# Patient Record
Sex: FEMALE | URBAN - METROPOLITAN AREA
[De-identification: names, ages, dates, MRNs, and addresses within clinical notes are randomized per-mention and may not be internally consistent; named-entity substitution may affect disease eponyms.]

---

## 2021-01-11 ENCOUNTER — APPOINTMENT (RX ONLY)
Dept: URBAN - METROPOLITAN AREA CLINIC 4 | Facility: CLINIC | Age: 46
Setting detail: DERMATOLOGY
End: 2021-01-11

## 2021-01-11 DIAGNOSIS — L57.0 ACTINIC KERATOSIS: ICD-10-CM

## 2021-01-11 DIAGNOSIS — Z71.89 OTHER SPECIFIED COUNSELING: ICD-10-CM

## 2021-01-11 PROCEDURE — ? SUNSCREEN TREATMENT REGIMEN

## 2021-01-11 PROCEDURE — ? COUNSELING

## 2021-01-11 PROCEDURE — 17000 DESTRUCT PREMALG LESION: CPT

## 2021-01-11 PROCEDURE — ? LIQUID NITROGEN

## 2021-01-11 ASSESSMENT — LOCATION ZONE DERM
LOCATION ZONE: FACE
LOCATION ZONE: NOSE

## 2021-01-11 ASSESSMENT — LOCATION SIMPLE DESCRIPTION DERM
LOCATION SIMPLE: INFERIOR FOREHEAD
LOCATION SIMPLE: NOSE

## 2021-01-11 ASSESSMENT — LOCATION DETAILED DESCRIPTION DERM
LOCATION DETAILED: INFERIOR MID FOREHEAD
LOCATION DETAILED: NASAL DORSUM

## 2021-01-11 NOTE — PROCEDURE: LIQUID NITROGEN
Number Of Freeze-Thaw Cycles: 2 freeze-thaw cycles
Post-Care Instructions: I reviewed with the patient in detail post-care instructions. Patient is to wear sunprotection, and avoid picking at any of the treated lesions. Pt may apply Vaseline to crusted or scabbing areas.
Detail Level: Detailed
Render Note In Bullet Format When Appropriate: No
Duration Of Freeze Thaw-Cycle (Seconds): 5
Consent: The patient's consent was obtained including but not limited to risks of crusting, scabbing, blistering, scarring, darker or lighter pigmentary change, recurrence, incomplete removal and infection.

## 2021-06-08 ENCOUNTER — HOSPITAL ENCOUNTER (OUTPATIENT)
Dept: LAB | Facility: MEDICAL CENTER | Age: 46
End: 2021-06-08
Attending: PSYCHIATRY & NEUROLOGY
Payer: COMMERCIAL

## 2021-06-08 LAB
ALBUMIN SERPL BCP-MCNC: 4.2 G/DL (ref 3.2–4.9)
ALBUMIN/GLOB SERPL: 1.3 G/DL
ALP SERPL-CCNC: 85 U/L (ref 30–99)
ALT SERPL-CCNC: 52 U/L (ref 2–50)
ANION GAP SERPL CALC-SCNC: 12 MMOL/L (ref 7–16)
AST SERPL-CCNC: 44 U/L (ref 12–45)
BASOPHILS # BLD AUTO: 1.2 % (ref 0–1.8)
BASOPHILS # BLD: 0.08 K/UL (ref 0–0.12)
BILIRUB SERPL-MCNC: 0.3 MG/DL (ref 0.1–1.5)
BUN SERPL-MCNC: 11 MG/DL (ref 8–22)
CALCIUM SERPL-MCNC: 8.8 MG/DL (ref 8.5–10.5)
CHLORIDE SERPL-SCNC: 102 MMOL/L (ref 96–112)
CHOLEST SERPL-MCNC: 216 MG/DL (ref 100–199)
CO2 SERPL-SCNC: 25 MMOL/L (ref 20–33)
CREAT SERPL-MCNC: 0.68 MG/DL (ref 0.5–1.4)
EOSINOPHIL # BLD AUTO: 0.41 K/UL (ref 0–0.51)
EOSINOPHIL NFR BLD: 6.3 % (ref 0–6.9)
ERYTHROCYTE [DISTWIDTH] IN BLOOD BY AUTOMATED COUNT: 51.8 FL (ref 35.9–50)
FASTING STATUS PATIENT QL REPORTED: NORMAL
FOLATE SERPL-MCNC: 6.6 NG/ML
GLOBULIN SER CALC-MCNC: 3.3 G/DL (ref 1.9–3.5)
GLUCOSE SERPL-MCNC: 112 MG/DL (ref 65–99)
HCT VFR BLD AUTO: 49.1 % (ref 37–47)
HDLC SERPL-MCNC: 54 MG/DL
HGB BLD-MCNC: 15.9 G/DL (ref 12–16)
IMM GRANULOCYTES # BLD AUTO: 0.02 K/UL (ref 0–0.11)
IMM GRANULOCYTES NFR BLD AUTO: 0.3 % (ref 0–0.9)
LDLC SERPL CALC-MCNC: 116 MG/DL
LYMPHOCYTES # BLD AUTO: 2.46 K/UL (ref 1–4.8)
LYMPHOCYTES NFR BLD: 37.9 % (ref 22–41)
MCH RBC QN AUTO: 34 PG (ref 27–33)
MCHC RBC AUTO-ENTMCNC: 32.4 G/DL (ref 33.6–35)
MCV RBC AUTO: 105.1 FL (ref 81.4–97.8)
MONOCYTES # BLD AUTO: 0.38 K/UL (ref 0–0.85)
MONOCYTES NFR BLD AUTO: 5.9 % (ref 0–13.4)
NEUTROPHILS # BLD AUTO: 3.14 K/UL (ref 2–7.15)
NEUTROPHILS NFR BLD: 48.4 % (ref 44–72)
NRBC # BLD AUTO: 0 K/UL
NRBC BLD-RTO: 0 /100 WBC
PLATELET # BLD AUTO: 205 K/UL (ref 164–446)
PMV BLD AUTO: 9.8 FL (ref 9–12.9)
POTASSIUM SERPL-SCNC: 4.2 MMOL/L (ref 3.6–5.5)
PROT SERPL-MCNC: 7.5 G/DL (ref 6–8.2)
RBC # BLD AUTO: 4.67 M/UL (ref 4.2–5.4)
SODIUM SERPL-SCNC: 139 MMOL/L (ref 135–145)
T4 FREE SERPL-MCNC: 0.98 NG/DL (ref 0.93–1.7)
TRIGL SERPL-MCNC: 229 MG/DL (ref 0–149)
TSH SERPL DL<=0.005 MIU/L-ACNC: 1.56 UIU/ML (ref 0.38–5.33)
VIT B12 SERPL-MCNC: 590 PG/ML (ref 211–911)
WBC # BLD AUTO: 6.5 K/UL (ref 4.8–10.8)

## 2021-06-08 PROCEDURE — 82607 VITAMIN B-12: CPT

## 2021-06-08 PROCEDURE — 85025 COMPLETE CBC W/AUTO DIFF WBC: CPT

## 2021-06-08 PROCEDURE — 80061 LIPID PANEL: CPT

## 2021-06-08 PROCEDURE — 36415 COLL VENOUS BLD VENIPUNCTURE: CPT

## 2021-06-08 PROCEDURE — 80053 COMPREHEN METABOLIC PANEL: CPT

## 2021-06-08 PROCEDURE — 84439 ASSAY OF FREE THYROXINE: CPT

## 2021-06-08 PROCEDURE — 82306 VITAMIN D 25 HYDROXY: CPT

## 2021-06-08 PROCEDURE — 84443 ASSAY THYROID STIM HORMONE: CPT

## 2021-06-08 PROCEDURE — 82746 ASSAY OF FOLIC ACID SERUM: CPT

## 2021-06-10 LAB — 25(OH)D3 SERPL-MCNC: 54 NG/ML (ref 30–100)

## 2024-11-12 ENCOUNTER — OFFICE VISIT (OUTPATIENT)
Dept: URGENT CARE | Facility: CLINIC | Age: 49
End: 2024-11-12
Payer: COMMERCIAL

## 2024-11-12 VITALS
HEIGHT: 67 IN | OXYGEN SATURATION: 99 % | WEIGHT: 185.8 LBS | HEART RATE: 96 BPM | TEMPERATURE: 99 F | BODY MASS INDEX: 29.16 KG/M2 | DIASTOLIC BLOOD PRESSURE: 80 MMHG | RESPIRATION RATE: 16 BRPM | SYSTOLIC BLOOD PRESSURE: 126 MMHG

## 2024-11-12 DIAGNOSIS — J06.9 VIRAL URI WITH COUGH: ICD-10-CM

## 2024-11-12 PROCEDURE — 3079F DIAST BP 80-89 MM HG: CPT | Performed by: STUDENT IN AN ORGANIZED HEALTH CARE EDUCATION/TRAINING PROGRAM

## 2024-11-12 PROCEDURE — 99203 OFFICE O/P NEW LOW 30 MIN: CPT | Performed by: STUDENT IN AN ORGANIZED HEALTH CARE EDUCATION/TRAINING PROGRAM

## 2024-11-12 PROCEDURE — 3074F SYST BP LT 130 MM HG: CPT | Performed by: STUDENT IN AN ORGANIZED HEALTH CARE EDUCATION/TRAINING PROGRAM

## 2024-11-12 RX ORDER — SERTRALINE HYDROCHLORIDE 25 MG/1
25 TABLET, FILM COATED ORAL DAILY
COMMUNITY

## 2024-11-12 RX ORDER — IRBESARTAN 150 MG/1
150 TABLET ORAL NIGHTLY
COMMUNITY

## 2024-11-12 NOTE — LETTER
November 12, 2024    To Whom It May Concern:         This is confirmation that Dennis Michelle attended her scheduled appointment with Dickson Cai P.A.-C. on 11/12/24.  Excused from work on 11/10/2024 through 11/12/2024.         If you have any questions please do not hesitate to call me at the phone number listed below.    Sincerely,          Dickson Cai P.A.-C.  [phone]

## 2024-11-12 NOTE — PROGRESS NOTES
"Subjective:   Dennis Michelle is a 49 y.o. female who presents for Cough (Runny nose, body aches, fatigue, loss of appetite x 1 week )      HPI:  49-year-old female presents to urgent care for 1 week of, body aches, fatigue, reduced appetite, and cough.  Patient's  had similar symptoms but his have since resolved.  No nausea, vomiting abdominal pain, chest pain, shortness of breath, ear pain.  Not experiencing any sinus pain or pressure.      Medications:    irbesartan Tabs  meloxicam  sertraline    Allergies: Patient has no known allergies.    Problem List: Dennis Michelle does not have a problem list on file.    Surgical History:  No past surgical history on file.    Past Social Hx: Dennis Michelle  reports that she has never smoked. She has never used smokeless tobacco.     Past Family Hx:  Dennis Michelle family history is not on file.     Problem list, medications, and allergies reviewed by myself today in Epic.     Objective:     /80   Pulse 96   Temp 37.2 °C (99 °F) (Temporal)   Resp 16   Ht 1.702 m (5' 7\")   Wt 84.3 kg (185 lb 12.8 oz)   SpO2 99%   BMI 29.10 kg/m²     Physical Exam  Vitals reviewed.   Constitutional:       Appearance: She is not toxic-appearing.   HENT:      Right Ear: Tympanic membrane, ear canal and external ear normal.      Left Ear: Tympanic membrane, ear canal and external ear normal.      Nose: Congestion present.      Right Sinus: No maxillary sinus tenderness or frontal sinus tenderness.      Left Sinus: No maxillary sinus tenderness or frontal sinus tenderness.      Mouth/Throat:      Mouth: Mucous membranes are moist.      Pharynx: No posterior oropharyngeal erythema.   Cardiovascular:      Rate and Rhythm: Normal rate and regular rhythm.      Pulses: Normal pulses.      Heart sounds: Normal heart sounds. No murmur heard.  Pulmonary:      Effort: Pulmonary effort is normal. No respiratory distress.      Breath sounds: Normal breath sounds. No " stridor. No wheezing, rhonchi or rales.         Assessment/Plan:     Diagnosis and associated orders:     1. Viral URI with cough           Comments/MDM:     Patient's presentation physical exam findings are consistent with an upper respiratory tract infection.  This does appear to be viral.  No signs of bacterial process at this time.  No indication for antibiotics.  Vitals are stable.  Pulmonary exam shows no wheezing, rales, rhonchi.  No signs of pneumonia.  No otitis media bilaterally.  Physical exam not consistent with strep throat.  Continue home supportive care.  Discussed typical timeframe for resolution of symptoms.         Differential diagnosis, natural history, supportive care, and indications for immediate follow-up discussed.    Advised the patient to follow-up with the primary care physician for recheck, reevaluation, and consideration of further management.    Please note that this dictation was created using voice recognition software. I have made a reasonable attempt to correct obvious errors, but I expect that there are errors of grammar and possibly content that I did not discover before finalizing the note.    Electronically signed by Dickson Cai PA-C.